# Patient Record
Sex: MALE | Race: WHITE | NOT HISPANIC OR LATINO
[De-identification: names, ages, dates, MRNs, and addresses within clinical notes are randomized per-mention and may not be internally consistent; named-entity substitution may affect disease eponyms.]

---

## 2022-01-19 PROBLEM — Z00.00 ENCOUNTER FOR PREVENTIVE HEALTH EXAMINATION: Status: ACTIVE | Noted: 2022-01-19

## 2022-01-26 ENCOUNTER — APPOINTMENT (OUTPATIENT)
Dept: SURGERY | Facility: CLINIC | Age: 67
End: 2022-01-26
Payer: OTHER MISCELLANEOUS

## 2022-01-26 VITALS — BODY MASS INDEX: 27.94 KG/M2 | HEIGHT: 61 IN | WEIGHT: 148 LBS

## 2022-01-26 DIAGNOSIS — E78.5 HYPERLIPIDEMIA, UNSPECIFIED: ICD-10-CM

## 2022-01-26 DIAGNOSIS — I25.10 ATHEROSCLEROTIC HEART DISEASE OF NATIVE CORONARY ARTERY W/OUT ANGINA PECTORIS: ICD-10-CM

## 2022-01-26 DIAGNOSIS — Z87.891 PERSONAL HISTORY OF NICOTINE DEPENDENCE: ICD-10-CM

## 2022-01-26 DIAGNOSIS — Z82.49 FAMILY HISTORY OF ISCHEMIC HEART DISEASE AND OTHER DISEASES OF THE CIRCULATORY SYSTEM: ICD-10-CM

## 2022-01-26 DIAGNOSIS — Z83.3 FAMILY HISTORY OF DIABETES MELLITUS: ICD-10-CM

## 2022-01-26 DIAGNOSIS — Z83.438 FAMILY HISTORY OF OTHER DISORDER OF LIPOPROTEIN METABOLISM AND OTHER LIPIDEMIA: ICD-10-CM

## 2022-01-26 DIAGNOSIS — I73.9 PERIPHERAL VASCULAR DISEASE, UNSPECIFIED: ICD-10-CM

## 2022-01-26 DIAGNOSIS — I10 ESSENTIAL (PRIMARY) HYPERTENSION: ICD-10-CM

## 2022-01-26 DIAGNOSIS — K21.9 GASTRO-ESOPHAGEAL REFLUX DISEASE W/OUT ESOPHAGITIS: ICD-10-CM

## 2022-01-26 DIAGNOSIS — E11.9 TYPE 2 DIABETES MELLITUS W/OUT COMPLICATIONS: ICD-10-CM

## 2022-01-26 DIAGNOSIS — I20.9 ANGINA PECTORIS, UNSPECIFIED: ICD-10-CM

## 2022-01-26 PROCEDURE — 99203 OFFICE O/P NEW LOW 30 MIN: CPT

## 2022-01-26 PROCEDURE — 99072 ADDL SUPL MATRL&STAF TM PHE: CPT

## 2022-01-26 RX ORDER — NITROGLYCERIN 0.4 MG/1
0.4 TABLET SUBLINGUAL
Refills: 0 | Status: ACTIVE | COMMUNITY

## 2022-01-26 RX ORDER — SIMETHICONE 125 MG
TABLET,CHEWABLE ORAL
Refills: 0 | Status: ACTIVE | COMMUNITY

## 2022-01-26 RX ORDER — CARVEDILOL 3.12 MG/1
TABLET, FILM COATED ORAL
Refills: 0 | Status: ACTIVE | COMMUNITY

## 2022-01-26 RX ORDER — CLOPIDOGREL 75 MG/1
75 TABLET, FILM COATED ORAL
Refills: 0 | Status: ACTIVE | COMMUNITY

## 2022-01-26 RX ORDER — FAMOTIDINE 20 MG/1
20 TABLET, FILM COATED ORAL
Refills: 0 | Status: ACTIVE | COMMUNITY

## 2022-01-26 RX ORDER — ASPIRIN 81 MG/1
81 TABLET, CHEWABLE ORAL
Refills: 0 | Status: ACTIVE | COMMUNITY

## 2022-01-26 RX ORDER — ATORVASTATIN CALCIUM 80 MG/1
TABLET, FILM COATED ORAL
Refills: 0 | Status: ACTIVE | COMMUNITY

## 2022-01-26 RX ORDER — LISINOPRIL 100 %
POWDER (GRAM) MISCELLANEOUS
Refills: 0 | Status: ACTIVE | COMMUNITY

## 2022-01-26 RX ORDER — IRON BIS-GLYCINAT/VIT C/FA/B12 28-60-0.4
CAPSULE ORAL
Refills: 0 | Status: ACTIVE | COMMUNITY

## 2022-01-26 RX ORDER — CHROMIUM 200 MCG
TABLET ORAL
Refills: 0 | Status: ACTIVE | COMMUNITY

## 2022-01-26 RX ORDER — ISOSORBIDE MONONITRATE 10 MG/1
10 TABLET ORAL
Refills: 0 | Status: ACTIVE | COMMUNITY

## 2022-01-26 NOTE — PLAN
[FreeTextEntry1] : This is a 66-year-old male who has multiple medical problems and is a vasculopath.  He has history of coronary artery bypass graft as well as coronary stents.  He also has claudication and has required bypass surgery as well as stents in his lower extremities.  He is planning on having further bypass surgery done on his leg in the near future.  The patient suffered a recent injury at work where a heavy cart fell over and struck him.  At that time he had injuries to his head and neck and he shortly thereafter noticed the hernia and it became symptomatic.  He is here for evaluation of the hernia which causes him discomfort.\par \par Review of systems: General-denies fatigue.  Cardiac- denies chest pain.  Respiratory- denies shortness of breath.  GI-denies nausea or vomiting.  -denies dysuria.  Musculoskeletal-denies back pain.  Psychiatric-denies hearing voices.\par \par Physical exam: Head-normocephalic.  Sclera are anicteric.  Neck-supple.  Chest-clear.  Abdomen-soft, nontender, nondistended.  Extremities-no edema.  In his right groin examined in the standing and supine position he has a reducible small hernia.  His testicles are normal in size bilaterally.  I do not appreciate an obvious hernia on the left.  He has no abdominal scars except the drain site from his coronary artery bypass graft in his epigastrium.\par \par Plan: The patient has a symptomatic right inguinal hernia.  It is unclear if his recent injury caused the hernia or simply caused it to become symptomatic.  His longstanding history of job of heavy lifting definitely can contribute to hernia formation.  He is on Plavix and due to have more vascular surgeries on his right leg.  In order to operate the patient will need to be able to hold his Plavix for 4 days prior to surgery and 3 days after surgery.  He may also require a Lovenox bridge if that is felt possible and necessary by his cardiologist.  Therefore will recommend this operation be done through an open approach as he will be less risk of bleeding.  If there is an issue with his leg in the postoperative period it would be safer to fully anticoagulate him sooner.  I have considered postponing the hernia surgery 2 after his vascular surgery but I suspect he will be on less anticoagulation now and will be safe for her to stop it now than later.  Patient understands that the hernia is unlikely to become an emergency and that the surgery is elective.  Understands that there is only a 15% chance that he will require an emergency surgery in the future.  He wishes to proceed with surgery.  He will need to get clearance by his cardiologist and his medical doctor.  I have sent a note to his cardiologist asking if it safe to hold Plavix and whether or not he needs a Lovenox bridge.  The patient will get Ancef prior to the procedure.\par \par Risk and benefits of surgery have explained to patient in detail.  These are including but not limited to the possibility of chronic pain.  The possibly of infection.  The possibility of bleeding.  The possibly of mesh infection requiring mesh removal removal in the future.  There is also the possibility that the surgery could lead temporarily to decrease blood flow to his leg and worsening of his claudication.  In his case there is also obviously increased risk of bleeding as well as the vascular risk of compromise from holding anticoagulation.  Plus there is the usual  medical risk associated with anesthesia including but not limited to cardiac, neurologic, pulmonary, and vascular complications including death.  Patient understands these risks and is agreeable to surgery.

## 2022-04-22 ENCOUNTER — RESULT REVIEW (OUTPATIENT)
Age: 67
End: 2022-04-22

## 2022-04-25 ENCOUNTER — APPOINTMENT (OUTPATIENT)
Dept: SURGERY | Facility: HOSPITAL | Age: 67
End: 2022-04-25

## 2022-04-25 ENCOUNTER — TRANSCRIPTION ENCOUNTER (OUTPATIENT)
Age: 67
End: 2022-04-25

## 2022-04-28 ENCOUNTER — NON-APPOINTMENT (OUTPATIENT)
Age: 67
End: 2022-04-28

## 2022-05-09 ENCOUNTER — APPOINTMENT (OUTPATIENT)
Dept: SURGERY | Facility: CLINIC | Age: 67
End: 2022-05-09
Payer: OTHER MISCELLANEOUS

## 2022-05-09 VITALS
HEART RATE: 81 BPM | WEIGHT: 139 LBS | SYSTOLIC BLOOD PRESSURE: 129 MMHG | TEMPERATURE: 98.2 F | DIASTOLIC BLOOD PRESSURE: 66 MMHG | BODY MASS INDEX: 26.26 KG/M2 | OXYGEN SATURATION: 99 %

## 2022-05-09 DIAGNOSIS — K40.91 UNILATERAL INGUINAL HERNIA, W/OUT OBSTRUCTION OR GANGRENE, RECURRENT: ICD-10-CM

## 2022-05-09 PROCEDURE — 99024 POSTOP FOLLOW-UP VISIT: CPT

## 2022-05-09 NOTE — PLAN
[FreeTextEntry1] : This is a second attempt at documentation as the all script computer system has failed once again.  The patient is 2 weeks status post repair of recurrent right inguinal hernia with mesh.  He has no complaints.  He is eating going to the bathroom.\par \par On exam his incision is well-healed.  There is no recurrence.  His testicles appear unremarkable with the right testicle slightly higher with a small seroma around it.\par \par Plan: The patient avoid heavy lifting for 1 additional month.  As he is a  he will need 1 month off from work.  He will follow with me on as-needed basis.  If an additional note appears from this date should be disregarded as it was part of a all scripts  computer failure.